# Patient Record
Sex: MALE | Race: WHITE | NOT HISPANIC OR LATINO
[De-identification: names, ages, dates, MRNs, and addresses within clinical notes are randomized per-mention and may not be internally consistent; named-entity substitution may affect disease eponyms.]

---
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
## 2023-01-05 ENCOUNTER — APPOINTMENT (OUTPATIENT)
Dept: ORTHOPEDIC SURGERY | Facility: CLINIC | Age: 18
End: 2023-01-05

## 2023-10-02 ENCOUNTER — NON-APPOINTMENT (OUTPATIENT)
Age: 18
End: 2023-10-02

## 2023-10-20 ENCOUNTER — APPOINTMENT (OUTPATIENT)
Dept: ORTHOPEDIC SURGERY | Facility: CLINIC | Age: 18
End: 2023-10-20
Payer: COMMERCIAL

## 2023-10-20 VITALS — BODY MASS INDEX: 17.22 KG/M2 | WEIGHT: 123 LBS | HEIGHT: 71 IN

## 2023-10-20 DIAGNOSIS — S43.431D SUPERIOR GLENOID LABRUM LESION OF RIGHT SHOULDER, SUBSEQUENT ENCOUNTER: ICD-10-CM

## 2023-10-20 PROCEDURE — 73030 X-RAY EXAM OF SHOULDER: CPT | Mod: RT

## 2023-10-20 PROCEDURE — 99214 OFFICE O/P EST MOD 30 MIN: CPT

## 2025-01-18 ENCOUNTER — NON-APPOINTMENT (OUTPATIENT)
Age: 20
End: 2025-01-18

## (undated) DEVICE — DRSG SLING ULTRA W ABDUCTION PILLOW LG

## (undated) DEVICE — ARTHREX STAR SLEEVE COBAN

## (undated) DEVICE — TUBING DEPUY MITEK FMS VUE INFLOW

## (undated) DEVICE — BAG SPONGE COUNTER EZ

## (undated) DEVICE — SOL IRR BAG NS 0.9% 3000ML

## (undated) DEVICE — DRAPE TOP SHEET 53"X101"

## (undated) DEVICE — PACK SHOULDER ARTHROSCOPY

## (undated) DEVICE — SUT CHIA PREPASSER

## (undated) DEVICE — DVC SUCTION FLR PUDDLE GUPPY

## (undated) DEVICE — STRP STRETCH

## (undated) DEVICE — WAND COBLATION WEREWOLF FLOW 90

## (undated) DEVICE — ARTHREX MULTIFIRE SCORPION NEEDLE

## (undated) DEVICE — POSITIONER FOAM HEAD CRADLE

## (undated) DEVICE — POSITIONER POSITIONING ROLL  5X17"

## (undated) DEVICE — DRSG IMMOBILIZER SHOULDER QUICK RELEASE LG

## (undated) DEVICE — BUR LINVATEC OVAL 4MM

## (undated) DEVICE — CANNULA ARTHREX TWIST IN 6MMX7CM

## (undated) DEVICE — SUT MONOSOF 4-0 18" P-12

## (undated) DEVICE — ARTHROCARE TURBOVAC 90 DEGREE WAND

## (undated) DEVICE — DRAPE INSTRUMENT POUCH

## (undated) DEVICE — CANNULA ARTHREX TWIST IN 8.25MMX7CM

## (undated) DEVICE — CANNULA LINVATEC SHOULDER 7CM GREY & ORANGE

## (undated) DEVICE — GLV 8 PROTEXIS

## (undated) DEVICE — ARTHREX KIT FOR 3.5MM PUSHLOCK

## (undated) DEVICE — DRSG XEROFORM 1"

## (undated) DEVICE — ADAPTER DUAL SPIKE

## (undated) DEVICE — BLANKET WARMER LOWER ADULT

## (undated) DEVICE — SHAVER BLADE LINVATEC FULL RADIUS RESECTOR 3.5MM

## (undated) DEVICE — POSITIONER STIRRUP STRAP W SLIP RING 19X3.5"

## (undated) DEVICE — KIT FIBERTAK CURVED DISP

## (undated) DEVICE — GOWN WARM BAIR PAWS STD

## (undated) DEVICE — TUBING SUCTION 20FT

## (undated) DEVICE — SUT LASSO 90 DEGREE CURVE STRAIGHT

## (undated) DEVICE — DRAPE SPLIT SHEETS 77X108"

## (undated) DEVICE — NDL SPINAL 18G X 3.5"

## (undated) DEVICE — POSITIONER STRAP ARMBOARD VELCRO TS-30 12

## (undated) DEVICE — TUBING DEPUY MITEK FMS OUTFLOW

## (undated) DEVICE — WRAP COMPRESSION CALF MED

## (undated) DEVICE — CANNULA ARTHREX TWIST IN NO SQUIRT CAP 7X7 PURPLE

## (undated) DEVICE — ARTHREX KIT SUTURETAK 3MM

## (undated) DEVICE — MASK FACE N95 1870 PLUS

## (undated) DEVICE — BLADE SAFETY LOCK #11